# Patient Record
Sex: MALE | Race: WHITE | NOT HISPANIC OR LATINO | Employment: FULL TIME | URBAN - METROPOLITAN AREA
[De-identification: names, ages, dates, MRNs, and addresses within clinical notes are randomized per-mention and may not be internally consistent; named-entity substitution may affect disease eponyms.]

---

## 2021-08-26 RX ORDER — AMLODIPINE BESYLATE 5 MG/1
5 TABLET ORAL DAILY
COMMUNITY
Start: 2021-07-14 | End: 2021-08-26 | Stop reason: SDUPTHER

## 2021-08-26 RX ORDER — PROPRANOLOL HYDROCHLORIDE 20 MG/1
20 TABLET ORAL 2 TIMES DAILY
COMMUNITY
Start: 2021-08-11 | End: 2021-11-22 | Stop reason: SDUPTHER

## 2021-08-27 RX ORDER — AMLODIPINE BESYLATE 5 MG/1
5 TABLET ORAL DAILY
Qty: 90 TABLET | Refills: 3 | Status: SHIPPED | OUTPATIENT
Start: 2021-08-27 | End: 2022-11-07 | Stop reason: SDUPTHER

## 2021-08-27 RX ORDER — AMLODIPINE BESYLATE 5 MG/1
5 TABLET ORAL DAILY
Qty: 90 TABLET | Refills: 3 | Status: SHIPPED | OUTPATIENT
Start: 2021-08-27 | End: 2021-11-22 | Stop reason: SDUPTHER
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
## 2021-11-22 RX ORDER — AMLODIPINE BESYLATE 5 MG/1
5 TABLET ORAL DAILY
Qty: 90 TABLET | Refills: 3 | Status: SHIPPED | OUTPATIENT
Start: 2021-11-22 | End: 2022-11-07 | Stop reason: SDUPTHER

## 2021-11-22 RX ORDER — PROPRANOLOL HYDROCHLORIDE 20 MG/1
20 TABLET ORAL 2 TIMES DAILY
Qty: 180 TABLET | Refills: 3 | Status: SHIPPED | OUTPATIENT
Start: 2021-11-22 | End: 2022-11-07 | Stop reason: SDUPTHER

## 2021-11-22 NOTE — TELEPHONE ENCOUNTER
----- Message from Isaias Lopes sent at 11/22/2021  2:13 PM CST -----  Contact: Self  Pt is calling to get a refill on medication. Please call pt back at [phone] to update and advise.

## 2022-11-07 DIAGNOSIS — I10 HYPERTENSION, UNSPECIFIED TYPE: Primary | ICD-10-CM

## 2022-11-07 RX ORDER — PROPRANOLOL HYDROCHLORIDE 20 MG/1
20 TABLET ORAL 2 TIMES DAILY
Qty: 60 TABLET | Refills: 0 | Status: SHIPPED | OUTPATIENT
Start: 2022-11-07 | End: 2022-11-21

## 2022-11-07 RX ORDER — AMLODIPINE BESYLATE 5 MG/1
5 TABLET ORAL DAILY
Qty: 90 TABLET | Refills: 3 | Status: SHIPPED | OUTPATIENT
Start: 2022-11-07 | End: 2023-03-10 | Stop reason: SDUPTHER

## 2022-11-07 NOTE — TELEPHONE ENCOUNTER
Spoke with wife about scheduling an appointment since its been nearly a year since the last visit with Dr. Villa . Wife said they would probably have to find a closer doctor and I understood I did send a 30 day refill for medication to hold the patient over until scheduling an appointment.

## 2023-02-22 ENCOUNTER — TELEPHONE (OUTPATIENT)
Dept: CARDIOLOGY | Facility: CLINIC | Age: 53
End: 2023-02-22

## 2023-02-22 NOTE — TELEPHONE ENCOUNTER
Patients pharmacy contiune to request for medication but patient has not been seen in [place] office LVM to schedule an appointment .

## 2023-03-10 DIAGNOSIS — I10 HYPERTENSION, UNSPECIFIED TYPE: ICD-10-CM

## 2023-03-10 RX ORDER — AMLODIPINE BESYLATE 5 MG/1
5 TABLET ORAL DAILY
Qty: 60 TABLET | Refills: 0 | Status: SHIPPED | OUTPATIENT
Start: 2023-03-10 | End: 2023-04-19 | Stop reason: SDUPTHER

## 2023-03-10 RX ORDER — PROPRANOLOL HYDROCHLORIDE 20 MG/1
20 TABLET ORAL 2 TIMES DAILY
Qty: 120 TABLET | Refills: 0 | Status: SHIPPED | OUTPATIENT
Start: 2023-03-10 | End: 2023-04-19 | Stop reason: SDUPTHER

## 2023-03-10 NOTE — TELEPHONE ENCOUNTER
----- Message from Paco Casillas sent at 3/10/2023  8:31 AM CST -----  Regarding: med question  Type: Needs Medical Advice    Who Called:  wife // Hope    Pharmacy name and phone #:    [facility] - [place] [place] [facility] [address] [facility]  [address] [place] [address]  Phone: [phone] Fax: [phone]    Best Call Back Number: [phone]     Additional Information: pt is former pt of Dr Villa before [facility]. Pt scheduled NP appt on 4/19/23. Pt will be out of meds before NP appt. Please call to discuss refill options.
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
## 2023-04-19 ENCOUNTER — OFFICE VISIT (OUTPATIENT)
Dept: CARDIOLOGY | Facility: CLINIC | Age: 53
End: 2023-04-19

## 2023-04-19 VITALS
HEART RATE: 85 BPM | WEIGHT: 282.5 LBS | SYSTOLIC BLOOD PRESSURE: 151 MMHG | DIASTOLIC BLOOD PRESSURE: 94 MMHG | OXYGEN SATURATION: 97 %

## 2023-04-19 DIAGNOSIS — R00.0 TACHYCARDIA: ICD-10-CM

## 2023-04-19 DIAGNOSIS — E55.9 VITAMIN D DEFICIENCY: ICD-10-CM

## 2023-04-19 DIAGNOSIS — E78.2 ELEVATED TRIGLYCERIDES WITH HIGH CHOLESTEROL: ICD-10-CM

## 2023-04-19 DIAGNOSIS — E04.1 THYROID CYST: ICD-10-CM

## 2023-04-19 DIAGNOSIS — E66.01 MORBID OBESITY: ICD-10-CM

## 2023-04-19 DIAGNOSIS — R94.31 NONSPECIFIC ABNORMAL ELECTROCARDIOGRAM (ECG) (EKG): ICD-10-CM

## 2023-04-19 DIAGNOSIS — I10 HYPERTENSION, UNSPECIFIED TYPE: Primary | ICD-10-CM

## 2023-04-19 PROCEDURE — 93010 ELECTROCARDIOGRAM REPORT: CPT | Mod: S$PBB,,, | Performed by: GENERAL PRACTICE

## 2023-04-19 PROCEDURE — 99204 PR OFFICE/OUTPT VISIT, NEW, LEVL IV, 45-59 MIN: ICD-10-PCS | Mod: S$PBB,,, | Performed by: GENERAL PRACTICE

## 2023-04-19 PROCEDURE — 99204 OFFICE O/P NEW MOD 45 MIN: CPT | Mod: S$PBB,,, | Performed by: GENERAL PRACTICE

## 2023-04-19 PROCEDURE — 93005 ELECTROCARDIOGRAM TRACING: CPT | Mod: PBBFAC,PN | Performed by: GENERAL PRACTICE

## 2023-04-19 PROCEDURE — 99999 PR PBB SHADOW E&M-EST. PATIENT-LVL III: CPT | Mod: PBBFAC,,, | Performed by: GENERAL PRACTICE

## 2023-04-19 PROCEDURE — 93010 EKG 12-LEAD: ICD-10-PCS | Mod: S$PBB,,, | Performed by: GENERAL PRACTICE

## 2023-04-19 PROCEDURE — 99999 PR PBB SHADOW E&M-EST. PATIENT-LVL III: ICD-10-PCS | Mod: PBBFAC,,, | Performed by: GENERAL PRACTICE

## 2023-04-19 PROCEDURE — 99213 OFFICE O/P EST LOW 20 MIN: CPT | Mod: PBBFAC,PN | Performed by: GENERAL PRACTICE

## 2023-04-19 RX ORDER — PROPRANOLOL HYDROCHLORIDE 20 MG/1
20 TABLET ORAL 2 TIMES DAILY
Qty: 180 TABLET | Refills: 0 | Status: SHIPPED | OUTPATIENT
Start: 2023-04-19 | End: 2023-07-31

## 2023-04-19 RX ORDER — TELMISARTAN 40 MG/1
40 TABLET ORAL DAILY
Qty: 90 TABLET | Refills: 3 | Status: SHIPPED | OUTPATIENT
Start: 2023-04-19 | End: 2024-04-18

## 2023-04-19 RX ORDER — AMLODIPINE BESYLATE 5 MG/1
5 TABLET ORAL DAILY
Qty: 90 TABLET | Refills: 0 | Status: SHIPPED | OUTPATIENT
Start: 2023-04-19

## 2023-04-19 RX ORDER — ATORVASTATIN CALCIUM 20 MG/1
20 TABLET, FILM COATED ORAL DAILY
Qty: 90 TABLET | Refills: 3 | Status: SHIPPED | OUTPATIENT
Start: 2023-04-19 | End: 2024-04-18

## 2023-07-24 NOTE — TELEPHONE ENCOUNTER
"Instructed on date and arrival time of 0930. Come to entrance \"C\". Must have  over age 18 to drive home.  May have two visitors; however, children under 12 must stay in waiting room.  Discussed clear liquid diet (no red or purple) and bowel prep.  May take medications as usual except for blood thinners, diabetic medications, and weight loss medications.  Bring list of medications.  Verbalized understanding of instructions given.  Phone number given for questions or concerns.  " Pt has not been seen since last November will let patient know this is only a 30 day script will have to make an appointment to be seen or find a provider closer to home =.

## 2023-07-29 DIAGNOSIS — I10 HYPERTENSION, UNSPECIFIED TYPE: ICD-10-CM

## 2023-07-31 RX ORDER — PROPRANOLOL HYDROCHLORIDE 20 MG/1
20 TABLET ORAL 2 TIMES DAILY
Qty: 180 TABLET | Refills: 0 | Status: SHIPPED | OUTPATIENT
Start: 2023-07-31 | End: 2023-10-27

## 2023-10-25 DIAGNOSIS — I10 HYPERTENSION, UNSPECIFIED TYPE: ICD-10-CM

## 2023-10-27 RX ORDER — PROPRANOLOL HYDROCHLORIDE 20 MG/1
20 TABLET ORAL 2 TIMES DAILY
Qty: 180 TABLET | Refills: 3 | Status: SHIPPED | OUTPATIENT
Start: 2023-10-27 | End: 2023-12-07 | Stop reason: ALTCHOICE

## 2023-12-07 RX ORDER — METOPROLOL TARTRATE 25 MG/1
25 TABLET, FILM COATED ORAL 2 TIMES DAILY
Qty: 60 TABLET | Refills: 11 | Status: SHIPPED | OUTPATIENT
Start: 2023-12-07 | End: 2024-12-06

## 2024-04-26 RX ORDER — TELMISARTAN 40 MG/1
40 TABLET ORAL
Qty: 30 TABLET | Refills: 0 | Status: SHIPPED | OUTPATIENT
Start: 2024-04-26

## 2024-08-01 NOTE — PROGRESS NOTES
Subjective:    Patient ID:  Fernando Gordon is a 53 y.o. male who presents for follow-up of   Chief Complaint   Patient presents with    Tachycardia       HPI:  53-YEAR-OLD MALE KNOWN TO ME WITH HISTORY OF HYPERLIPIDEMIA HYPERTENSION.  He comes in today because he had episode of tachycardia 3 weeks ago at work.  Noticed that his propranolol 20 mg b.i.d. which he has taken for years was changed to metoprolol 25 b.i.d. does not seem to work as well wears off at 90 wakes up with chest pain he took his beta-blocker when his wife brought to him and his heart rate in symptoms went back to normal.  [facility] was called they did EKG which did show anything acute.  He has however had a dull pain on the left side of his the ribcage around his the filling which has not gone away his blood pressures been running high 160 to 170/90.  Does not sleep at night does not think it is sleep apnea but he does have pain like a nerve pain in his foot we had a which were blocked dropped on his left foot years past.  He has not seen a neurologist not taken anything for but it keeps him up at night.  He has not had any blood work for some time        Here for followup.    EKG NORMAL SINUS RHYTHM    4/19/23  History of HTN , PALPITATION WITH TACHYCARDIAS, THYROID CYST, ELEVATED TRIGLYCERIDES WITH ELEVATED CHOLESTEROL AND OBESITY.  HE HAS HAD A HIGH STRESS JOB IN THE PAST IN 4 YOUNG CHILDREN, AND DID NOT SLEEP WELL BUT CURRENTLY IS sleeping well.  He has been of for hypertension with amlodipine 5 mg Micardis 40 and propranolol 20 mg b.i.d. with good results.  We tried fenofibrate and Vascepa for triglycerides in the past but they were not affordable.  He had a CTA of the chest secondary to a fixed defect on the thallium 08/03/2010.  His calcium score was 0 was right dominant coronary system and no coronary disease was noted at that time.  He is here for follow-up he needs a refill for his medications will occasionally self check his blood pressure  "last 1 was 127 systolic over 78.          I reviewed his old records and blood work.  His medications will be resumed at the previous dose.  They agree with him well.  He has gained some weight and wants to lose weight.  He had a thyroid cyst which was biopsied and found to be benign.  He is some blood work.  His triglycerides run the highest 02/04/2022 and 190 in the past.  Will going to place him on a atorvastatin 20 mg to get his cholesterol to goes last cholesterol was 240 with HDL 32 and LDL 36.  He is taking a vitamin-D supplement over-the-counter which she will continue.  Recommended home blood pressure monitoring with a cuff periodically once a week twice a month.     EKG today reveals normal sinus rhythm rightward axis borderline EKG.  Follow up in about 6 months (around 10/19/2023).    Review of patient's allergies indicates:  No Known Allergies    No past medical history on file.  No past surgical history on file.  Social History     Tobacco Use    Smoking status: Never    Smokeless tobacco: Never   Substance Use Topics    Alcohol use: Not Currently    Drug use: Never     No family history on file.     Review of Systems:   Constitution: Negative for diaphoresis and fever.   HEENT: Negative for nosebleeds.    Cardiovascular: Negative for chest pain       No dyspnea on exertion       No leg swelling        No palpitations  Respiratory: Negative for shortness of breath and wheezing.    Hematologic/Lymphatic: Negative for bleeding problem. Does not bruise/bleed easily.   Skin: Negative for color change and rash.   Musculoskeletal: Negative for falls and myalgias.   Gastrointestinal: Negative for hematemesis and hematochezia.   Genitourinary: Negative for hematuria.   Neurological: Negative for dizziness and light-headedness.   Psychiatric/Behavioral: Negative for altered mental status and memory loss.          Objective:        There were no vitals filed for this visit.    No results found for: "WBC", "HGB", " ""HCT", "PLT", "CHOL", "TRIG", "HDL", "LDLDIRECT", "ALT", "AST", "NA", "K", "CL", "CREATININE", "BUN", "CO2", "TSH", "PSA", "INR", "GLUF", "HGBA1C", "MICROALBUR"     ECHOCARDIOGRAM RESULTS  No results found for this or any previous visit.        CURRENT/PREVIOUS VISIT EKG  Results for orders placed or performed in visit on 04/19/23   IN OFFICE EKG 12-LEAD (to [place])    Collection Time: 04/19/23 11:15 AM    Narrative    Test Reason : I10,R94.31,    Vent. Rate : 087 BPM     Atrial Rate : 087 BPM     P-R Int : 140 ms          QRS Dur : 082 ms      QT Int : 354 ms       P-R-T Axes : 070 091 067 degrees     QTc Int : 425 ms    Normal sinus rhythm  Rightward axis  Borderline Abnormal ECG  No previous ECGs available  Confirmed by Daisy FONTAINE, Anirudh CORREIA ([id number]) on 4/26/2023 10:11:07 PM    Referred By: [email or web address]   SELF           Confirmed By:Anirudh Villa MD     No valid procedures specified.   No results found for this or any previous visit.      Physical Exam:  CONSTITUTIONAL: No fever, no chills  HEENT: Normocephalic, atraumatic,pupils reactive to light                 NECK:  No JVD no carotid bruit  CVS: S1S2+, RRR, no murmurs,   LUNGS: Clear  ABDOMEN: Soft, NT, BS+  EXTREMITIES: No cyanosis, mild discoloration both lower extremities above the ankle : No leon catheter  NEURO: AAO X 3  PSY: Normal affect      Medication List with Changes/Refills   New Medications    GABAPENTIN (NEURONTIN) 300 MG CAPSULE    Take 1 capsule (300 mg total) by mouth 2 (two) times daily.    NEBIVOLOL (BYSTOLIC) 5 MG TAB    Take 1 tablet (5 mg total) by mouth once daily.   Current Medications    MULTIVITAMIN (THERAGRAN) PER TABLET    Take 1 tablet by mouth once daily.   Changed and/or Refilled Medications    Modified Medication Previous Medication    AMLODIPINE (NORVASC) 5 MG TABLET amLODIPine (NORVASC) 5 MG tablet       Take 1 tablet (5 mg total) by mouth once daily.    Take 1 tablet (5 mg total) by mouth once daily.    ATORVASTATIN (LIPITOR) " 20 MG TABLET atorvastatin (LIPITOR) 20 MG tablet       Take 1 tablet (20 mg total) by mouth once daily.    Take 1 tablet (20 mg total) by mouth once daily.    TELMISARTAN (MICARDIS) 40 MG TAB telmisartan (MICARDIS) 40 MG Tab       Take 1 tablet (40 mg total) by mouth once daily.    Take 1 tablet by mouth once daily   Discontinued Medications    METOPROLOL TARTRATE (LOPRESSOR) 25 MG TABLET    Take 1 tablet (25 mg total) by mouth 2 (two) times daily.             Assessment:       1. Hypertension, unspecified type    2. Nonspecific abnormal electrocardiogram (ECG) (EKG)    3. Elevated triglycerides with high cholesterol    4. Morbid obesity    5. Tachycardia    6. Thyroid cyst    7. Venous reflux    8. Chest pain, unspecified type    9. Neuropathy of both feet    10. Sleep disorder         Plan:     Problem List Items Addressed This Visit    None  Visit Diagnoses       Hypertension, unspecified type    -  Primary    Relevant Medications    amLODIPine (NORVASC) 5 MG tablet    telmisartan (MICARDIS) 40 MG Tab    Other Relevant Orders    Echo    Exercise Stress - EKG    TSH    T4, Free    Lipid Panel    CBC Without Differential    Comprehensive Metabolic Panel    Nonspecific abnormal electrocardiogram (ECG) (EKG)        Elevated triglycerides with high cholesterol        Relevant Medications    atorvastatin (LIPITOR) 20 MG tablet    Other Relevant Orders    CRP, High Sensitivity    Morbid obesity        Relevant Orders    VAS US Venous Legs Bilateral    CRP, High Sensitivity    Tachycardia        Relevant Orders    IN OFFICE EKG 12-LEAD (to [facility])    Echo    Exercise Stress - EKG    TSH    T4, Free    Lipid Panel    CBC Without Differential    Comprehensive Metabolic Panel    VAS US Venous Legs Bilateral    CRP, High Sensitivity    Stress Echo Which stress agent will be used? Treadmill Exercise; Color Flow Doppler? No    Thyroid cyst        Venous reflux        Relevant Orders    Echo    Exercise Stress - EKG    TSH    T4,  Free    Lipid Panel    CBC Without Differential    Comprehensive Metabolic Panel    VAS US Venous Legs Bilateral    Chest pain, unspecified type        Relevant Orders    CRP, High Sensitivity    Stress Echo Which stress agent will be used? Treadmill Exercise; Color Flow Doppler? No    Neuropathy of both feet        Relevant Orders    CRP, High Sensitivity    Sleep disorder        Relevant Orders    CRP, High Sensitivity        Left-sided chest aching.  It is a dull pain which has not gone away for about 3 weeks is not tender to touch her palpation is not worse with breathing.  Recommend is stress echo to evaluate    Pain in the left foot neuropathic pain which causes a sleep disorder will give him a trial of Neurontin 300 mg b.i.d. may need to refer to neurology she has    Hypertension blood pressure is elevated since he has of the tachycardias will change to a stronger medicine than the low-dose propranolol 20 mg b.i.d..  We he is up to Bystolic 5 mg a day which should help his tachycardias and his hypertension without side effects    Hyperlipidemia recommend some blood work    Sleep disorder as above he does not feel like he is having sleep apnea symptoms    Venous reflux he has some mild swelling in his left leg some discoloration and little of pain in the area which still with venous reflux consider support stockings venous survey    Follow up in about 3 months (around 11/2/2024).    The patients questions were answered, they verbalized understanding, and agreed with the treatment plan.     MARTINEZ VAIL MD  [facility]

## 2024-08-02 ENCOUNTER — OFFICE VISIT (OUTPATIENT)
Dept: CARDIOLOGY | Facility: CLINIC | Age: 54
End: 2024-08-02

## 2024-08-02 DIAGNOSIS — I10 HYPERTENSION, UNSPECIFIED TYPE: Primary | ICD-10-CM

## 2024-08-02 DIAGNOSIS — I87.2 VENOUS REFLUX: ICD-10-CM

## 2024-08-02 DIAGNOSIS — R00.0 TACHYCARDIA: ICD-10-CM

## 2024-08-02 DIAGNOSIS — E78.2 ELEVATED TRIGLYCERIDES WITH HIGH CHOLESTEROL: ICD-10-CM

## 2024-08-02 DIAGNOSIS — G47.9 SLEEP DISORDER: ICD-10-CM

## 2024-08-02 DIAGNOSIS — R94.31 NONSPECIFIC ABNORMAL ELECTROCARDIOGRAM (ECG) (EKG): ICD-10-CM

## 2024-08-02 DIAGNOSIS — E04.1 THYROID CYST: ICD-10-CM

## 2024-08-02 DIAGNOSIS — E66.01 MORBID OBESITY: ICD-10-CM

## 2024-08-02 DIAGNOSIS — R07.9 CHEST PAIN, UNSPECIFIED TYPE: ICD-10-CM

## 2024-08-02 DIAGNOSIS — G57.93 NEUROPATHY OF BOTH FEET: ICD-10-CM

## 2024-08-02 PROCEDURE — 99213 OFFICE O/P EST LOW 20 MIN: CPT | Mod: PBBFAC,PN | Performed by: GENERAL PRACTICE

## 2024-08-02 PROCEDURE — 99999 PR PBB SHADOW E&M-EST. PATIENT-LVL III: CPT | Mod: PBBFAC,,, | Performed by: GENERAL PRACTICE

## 2024-08-02 RX ORDER — METOPROLOL TARTRATE 25 MG/1
25 TABLET, FILM COATED ORAL 2 TIMES DAILY
Qty: 180 TABLET | Refills: 3 | Status: CANCELLED | OUTPATIENT
Start: 2024-08-02 | End: 2025-08-02

## 2024-08-02 RX ORDER — ATORVASTATIN CALCIUM 20 MG/1
20 TABLET, FILM COATED ORAL DAILY
Qty: 90 TABLET | Refills: 3 | Status: SHIPPED | OUTPATIENT
Start: 2024-08-02 | End: 2025-08-02

## 2024-08-02 RX ORDER — MULTIVITAMIN
1 TABLET ORAL DAILY
COMMUNITY

## 2024-08-02 RX ORDER — NEBIVOLOL 5 MG/1
5 TABLET ORAL DAILY
Qty: 30 TABLET | Refills: 11 | Status: SHIPPED | OUTPATIENT
Start: 2024-08-02 | End: 2025-08-02

## 2024-08-02 RX ORDER — AMLODIPINE BESYLATE 5 MG/1
5 TABLET ORAL DAILY
Qty: 90 TABLET | Refills: 3 | Status: SHIPPED | OUTPATIENT
Start: 2024-08-02

## 2024-08-02 RX ORDER — TELMISARTAN 40 MG/1
40 TABLET ORAL DAILY
Qty: 90 TABLET | Refills: 3 | Status: SHIPPED | OUTPATIENT
Start: 2024-08-02

## 2024-08-02 RX ORDER — GABAPENTIN 300 MG/1
300 CAPSULE ORAL 2 TIMES DAILY
Qty: 60 CAPSULE | Refills: 11 | Status: SHIPPED | OUTPATIENT
Start: 2024-08-02 | End: 2025-08-02

## 2025-07-01 DIAGNOSIS — I10 HYPERTENSION, UNSPECIFIED TYPE: ICD-10-CM

## 2025-07-01 DIAGNOSIS — R00.0 TACHYCARDIA: Primary | ICD-10-CM

## 2025-07-01 NOTE — TELEPHONE ENCOUNTER
Copied from CRM #[id number]. Topic: Medications - Medication Refill  >> Jul 1, 2025  9:52 AM Stepan wrote:  Type:  RX Refill Request    Who Called: pt     Refill or New Rx:refill     RX Name and Strength:    nebivoloL (BYSTOLIC) 5 MG Tab30 [id number]/2/2025Sig - Route: Take 1 tablet (5 mg total) by mouth once daily. - OralSent to pharmacy as: nebivoloL (BYSTOLIC) 5 MG TabE-Prescribing Status: Receipt confirmed by pharmacy (8/2/2024  9:58 AM CDT)     How is the patient currently taking it? (ex. 1XDay):see above    Is this a 30 day or 90 day RX:see above    Preferred Pharmacy with phone number:    [facility] - [id number] [address] [address]  Phone: [phone] Fax: [phone]    Local or Mail Order:local     Ordering Provider:luis angel Alfaro Call Back Number:[phone]    Additional Information: pt is almost out of meds.

## 2025-07-02 RX ORDER — NEBIVOLOL 5 MG/1
5 TABLET ORAL DAILY
Qty: 30 TABLET | Refills: 1 | Status: SHIPPED | OUTPATIENT
Start: 2025-07-02 | End: 2025-08-31

## 2025-07-31 DIAGNOSIS — I10 HYPERTENSION, UNSPECIFIED TYPE: ICD-10-CM

## 2025-07-31 RX ORDER — AMLODIPINE BESYLATE 5 MG/1
5 TABLET ORAL
Qty: 90 TABLET | Refills: 0 | Status: SHIPPED | OUTPATIENT
Start: 2025-07-31

## 2025-07-31 RX ORDER — TELMISARTAN 40 MG/1
40 TABLET ORAL
Qty: 90 TABLET | Refills: 0 | Status: SHIPPED | OUTPATIENT
Start: 2025-07-31

## 2025-08-22 DIAGNOSIS — E78.2 ELEVATED TRIGLYCERIDES WITH HIGH CHOLESTEROL: ICD-10-CM

## 2025-08-22 DIAGNOSIS — R00.0 TACHYCARDIA: ICD-10-CM

## 2025-08-22 DIAGNOSIS — I10 HYPERTENSION, UNSPECIFIED TYPE: ICD-10-CM

## 2025-08-25 RX ORDER — TELMISARTAN 40 MG/1
40 TABLET ORAL DAILY
Qty: 90 TABLET | Refills: 0 | Status: SHIPPED | OUTPATIENT
Start: 2025-08-25

## 2025-08-25 RX ORDER — ATORVASTATIN CALCIUM 20 MG/1
20 TABLET, FILM COATED ORAL DAILY
Qty: 90 TABLET | Refills: 0 | Status: SHIPPED | OUTPATIENT
Start: 2025-08-25 | End: 2026-08-25

## 2025-08-25 RX ORDER — NEBIVOLOL 5 MG/1
5 TABLET ORAL DAILY
Qty: 90 TABLET | Refills: 0 | Status: SHIPPED | OUTPATIENT
Start: 2025-08-25 | End: 2025-11-23